# Patient Record
(demographics unavailable — no encounter records)

---

## 2017-03-22 DIAGNOSIS — Z30.011 ENCOUNTER FOR INITIAL PRESCRIPTION OF CONTRACEPTIVE PILLS: ICD-10-CM

## 2017-06-13 DIAGNOSIS — Z30.011 ENCOUNTER FOR INITIAL PRESCRIPTION OF CONTRACEPTIVE PILLS: ICD-10-CM

## 2017-06-13 RX ORDER — NORGESTIMATE AND ETHINYL ESTRADIOL 0.25-0.035
1 KIT ORAL DAILY
Qty: 28 TABLET | Refills: 0 | Status: SHIPPED | OUTPATIENT
Start: 2017-06-13

## 2017-06-13 NOTE — TELEPHONE ENCOUNTER
----- Message from Tanya Larry sent at 6/13/2017  2:28 PM CDT -----  Contact: self  Pt is calling in regards of seeing if she can MONONESSA refilled. The pt would like it sent to  [facility] on [address] in [place], [place]. The pt can be reached at [phone]. Thanks KG

## 2017-06-13 NOTE — TELEPHONE ENCOUNTER
Patient was informed that one month supply of Mononessa will refilled due to her not being seen since 2015,patient will find a provider closer to her current home,verbilazed understanding.